# Patient Record
(demographics unavailable — no encounter records)

---

## 2019-11-05 NOTE — XRAY REPORT
CHEST 1 VIEW 



INDICATION:  Lightheadedness/Dizziness.



COMPARISON:  None



FINDINGS:

Support devices: None. 



Heart: Within normal limits. 

Lungs/Pleura: No acute air space or interstitial disease. 



Additional findings: None.



IMPRESSION:

 No acute findings.



Signer Name: Bennie Wells Jr, MD 

Signed: 11/5/2019 12:28 PM

 Workstation Name: [id number]

## 2019-11-05 NOTE — EMERGENCY DEPARTMENT REPORT
HPI





- General


Chief Complaint: Dizziness


Time Seen by Provider: 11/05/19 12:57





- HPI


HPI: 


48-year-old  [place] female, who works here in labor and delivery, 

presents to the emergency department after she had some type of episode where 

she became dizzy and had some numbness or paresthesias.  Patient says that she 

was climbing some stairs when she became slightly winded.  She then had some 

numbness and tingling to the face, around her lips, and to her bilateral hands. 

She has a very mild frontal headache.  She denies any vision change, slurred 

speech, chest pain.  She has a past medical history of hyperlipidemia.  She did 

not take anything for her symptoms prior to presentation.








ED Past Medical Hx





- Past Medical History


Previous Medical History?: Yes


Hx Arthritis: Yes


Hx Asthma: Yes





- Surgical History


Past Surgical History?: Yes


Additional Surgical History: colon





- Social History


Smoking Status: Never Smoker


Substance Use Type: None





- Medications


Home Medications: 


                                Home Medications











 Medication  Instructions  Recorded  Confirmed  Last Taken  Type


 


Acetaminophen/Codeine [Tylenol 1 tab PO Q6H PRN #12 tab 11/30/18  Unknown Rx





/Codeine # 3 tab]     


 


Prednisone [predniSONE 10 mg 10 mg PO .TAPER #1 tab.ds.pk 11/30/18  Unknown Rx





(6-Day Pack, 21 Tabs)]     














ED Review of Systems


ROS: 


Stated complaint: FEET/HAND/FACE NUMB


Other details as noted in HPI





Comment: All other systems reviewed and negative


Constitutional: denies: chills, fever


Eyes: denies: eye pain, vision change


ENT: denies: ear pain, throat pain


Respiratory: shortness of breath.  denies: cough


Cardiovascular: denies: chest pain, palpitations


Gastrointestinal: denies: abdominal pain, vomiting


Genitourinary: denies: dysuria, discharge


Musculoskeletal: denies: back pain, arthralgia


Skin: denies: rash, lesions


Neurological: headache, numbness, paresthesias, other (dizziness).  denies: 

weakness





Physical Exam





- Physical Exam


Vital Signs: 


                                   Vital Signs











  11/05/19 11/05/19





  11:47 13:19


 


Temperature 98.5 F 


 


Pulse Rate 80 


 


Respiratory 18 16





Rate  


 


Blood Pressure 149/86 





[Right]  


 


O2 Sat by Pulse 99 





Oximetry  











Physical Exam: 





GENERAL: The patient is well-developed well-nourished.


HENT: Normocephalic.  Atraumatic.    Patient has moist mucous membranes.


EYES: Extraocular motions are intact.  Pupils equal reactive to light bilat

erally.


NECK: Supple. Trachea is midline.


CHEST/LUNGS: Clear to auscultation.  There is no respiratory distress noted.


HEART/CARDIOVASCULAR: Regular.  There is no tachycardia.  There is no murmur.


ABDOMEN: Abdomen is soft, nontender.  Patient has normal bowel sounds.  There is

 no abdominal distention.


SKIN: Skin is warm and dry.


NEURO: The patient is awake, alert, and oriented.  The patient is cooperative.  

The patient has no focal neurologic deficits.  Normal speech.  Cranial nerves II

 through XII grossly intact.  No pronator drift.  No dysmetria.  No facial 

asymmetry.


MUSCULOSKELETAL: There is no tenderness or deformity.  There is no limitation 

range of motion.  There is no evidence of acute injury.





ED Course


                                   Vital Signs











  11/05/19 11/05/19





  11:47 13:19


 


Temperature 98.5 F 


 


Pulse Rate 80 


 


Respiratory 18 16





Rate  


 


Blood Pressure 149/86 





[Right]  


 


O2 Sat by Pulse 99 





Oximetry  














ED Medical Decision Making





- Lab Data


Result diagrams: 


                                 11/05/19 12:37





                                 11/05/19 12:37





- EKG Data


-: EKG Interpreted by Me


EKG shows normal: sinus rhythm, axis, intervals, QRS complexes, ST-T waves


Rate: normal





- EKG Data


When compared to previous EKG there are: previous EKG unavailable


Interpretation: normal EKG





- Radiology Data


Radiology results: image reviewed


interpreted by me: 





Chest x-ray does not show any acute process.  There are no pleural effusions, 

obvious pneumonia and there is no pneumothorax.





- Medical Decision Making


This patient presents after she had some type of incident in which she became 

dizzy or lightheaded, had some numbness or paresthesias, and had some mild amie

rtness of breath after climbing a few flights of stairs.  At the time of my 

examination, the patient is awake, alert, oriented without any neurological 

deficits or signs of shortness of breath and is back at her baseline status.  

Cranial nerves are intact.  There is no facial asymmetry, pronator drift, 

dysmetria.  Labs are unremarkable including CBC, metabolic panel, TSH and 

urinalysis.  A chest x-ray was done that does not show any pleural effusions, 

pneumonia, pneumothorax, focal consolidation, or any other acute process.  EKG 

does not show any signs of ST elevation MI or significant dysrhythmia.  The 

patient was seen ambulatory in the emergency department and both appears and 

feels stable.  The patient declined having a CT scan of the head done at this 

time and given that she is at her baseline status without any obvious deficits, 

I will not pressure her to have the imaging done.  However the patient 

understands that she must either call 911 or return to the emergency department 

immediately with any return or worsening of her symptoms that she may need a CT 

scan of the head done at that time.  Otherwise she has good follow-up with a 

local primary care provider.  Her vital signs have been stable throughout her ED

 course.








- Differential Diagnosis


TIA, dysrhythmia, electrolyte abnormalities, hypoglycemia


Critical Care Time: No


Critical care attestation.: 


If time is entered above; I have spent that time in minutes in the direct care 

of this critically ill patient, excluding procedure time.








ED Disposition


Clinical Impression: 


 Dizziness, Paresthesias, Numbness and tingling





Disposition: DC-01 TO HOME OR SELFCARE


Is pt being admited?: No


Condition: Stable


Instructions:  Paresthesia (ED), Lightheadedness (ED), Dizziness (ED)


Additional Instructions: 


Please follow-up with your primary care physician.  Return to the emergency De

partment with any worsening of your symptoms or any acute distress.


Referrals: 


TY LERNER NP-C [Referring] - 2-3 Days


Time of Disposition: 14:24